# Patient Record
Sex: FEMALE | Race: WHITE | URBAN - METROPOLITAN AREA
[De-identification: names, ages, dates, MRNs, and addresses within clinical notes are randomized per-mention and may not be internally consistent; named-entity substitution may affect disease eponyms.]

---
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
## 2018-03-09 ENCOUNTER — CARE COORDINATION (OUTPATIENT)
Dept: GASTROENTEROLOGY | Facility: CLINIC | Age: 18
End: 2018-03-09

## 2018-03-09 DIAGNOSIS — K59.01 SLOW TRANSIT CONSTIPATION: Primary | ICD-10-CM

## 2018-03-09 DIAGNOSIS — K21.9 GASTROESOPHAGEAL REFLUX DISEASE WITHOUT ESOPHAGITIS: ICD-10-CM

## 2018-03-09 RX ORDER — OMEPRAZOLE 40 MG/1
40 CAPSULE, DELAYED RELEASE ORAL DAILY
Qty: 90 CAPSULE | Refills: 3 | Status: SHIPPED | OUTPATIENT
Start: 2018-03-09 | End: 2018-03-12

## 2018-03-09 RX ORDER — POLYETHYLENE GLYCOL 3350 17 G/17G
1 POWDER, FOR SOLUTION ORAL DAILY
Qty: 500 G | Refills: 1 | Status: SHIPPED | OUTPATIENT
Start: 2018-03-09

## 2018-03-09 NOTE — PROGRESS NOTES
"email rec'd from Aiyana Bearden (mom):    Claudia has been in pain while eating or drinking for the past several weeks and possibly longer as this strange event began to take hold while she was at college at [employer] in [place].  She is now only eating and drinking in a very minimal manner.  After going to the ER two days ago, due to exhaustion and pain, and being told she really just need to go to a specialist, she received permission from the school to depart before the end of the trimester and I picked her up yesterday.  I will take her back to a er if this continues to worsen for her, or maybe it will get better, if we are unable to see the doctor before Monday.   I have not seen her since Christmas break and she is clearly different.  She say she has said she has never been so exhausted, and she clearly is in pain even when not drinking or eating.  She has currently discontinued her regular use of mirilax.  Please let me know if there is anything available this afternoon or call Claudia.  Claudia  [phone] or me Aiyana .    Per Claudia, for 2 months drinking water causes terrible clenching pain under he ribs, inconsistently on the left or right. For 1 week the pain comes with eating/drinking, but worst with water. Way worse than it ever was  No stool out x 1 week. \"Softish\" daily daily stools before that, no blood, no fever.  Taking Miralax daily except the past couple of days. Saw a local MD who suggested Zantac; Claudia doesn't think it's made any difference.      Per Dr. Martin, suggest 40 mg of omeprazole (or insurance allowed) and bowel clean out.    "

## 2018-03-12 ENCOUNTER — OFFICE VISIT (OUTPATIENT)
Dept: GASTROENTEROLOGY | Facility: CLINIC | Age: 18
End: 2018-03-12
Attending: PEDIATRICS
Payer: COMMERCIAL

## 2018-03-12 ENCOUNTER — TELEPHONE (OUTPATIENT)
Dept: GASTROENTEROLOGY | Facility: CLINIC | Age: 18
End: 2018-03-12

## 2018-03-12 VITALS
BODY MASS INDEX: 25.64 KG/M2 | SYSTOLIC BLOOD PRESSURE: 122 MMHG | WEIGHT: 153.88 LBS | HEIGHT: 65 IN | DIASTOLIC BLOOD PRESSURE: 90 MMHG | HEART RATE: 79 BPM

## 2018-03-12 DIAGNOSIS — K21.9 GASTROESOPHAGEAL REFLUX DISEASE WITHOUT ESOPHAGITIS: ICD-10-CM

## 2018-03-12 DIAGNOSIS — E73.9 LACTOSE INTOLERANCE: ICD-10-CM

## 2018-03-12 DIAGNOSIS — R11.0 NAUSEA: ICD-10-CM

## 2018-03-12 DIAGNOSIS — K59.01 SLOW TRANSIT CONSTIPATION: Primary | ICD-10-CM

## 2018-03-12 PROCEDURE — G0463 HOSPITAL OUTPT CLINIC VISIT: HCPCS | Mod: ZF

## 2018-03-12 RX ORDER — OMEPRAZOLE 40 MG/1
40 CAPSULE, DELAYED RELEASE ORAL DAILY
Qty: 90 CAPSULE | Refills: 3 | Status: SHIPPED | OUTPATIENT
Start: 2018-03-12 | End: 2018-09-25

## 2018-03-12 ASSESSMENT — PAIN SCALES - GENERAL: PAINLEVEL: NO PAIN (0)

## 2018-03-12 NOTE — TELEPHONE ENCOUNTER
Prior Authorization Retail Medication Request    Medication/Dose: omeprazole or esopmeprazole  ICD code GERD K21.9  Previously Tried and Failed: ranitadine; sx persisted  Rationale:    Insurance Name: [employer]  Insurance ID: [id number]      Pharmacy Information (if different than what is on RX)  Name: Samantha  [address]  Phone: [phone]

## 2018-03-12 NOTE — PATIENT INSTRUCTIONS
If you have any questions during regular office hours, please contact the nurse line at [phone] (Diana or Elisabeth).     If acute concerns arise after hours, you can call [phone] and ask to speak to the pediatric gastroenterologist on call.     If you have scheduling needs, please call the Call Center at [phone].     Outside lab and imaging results should be faxed to [phone].  If you go to a lab outside of [place] we will not automatically get those results. You will need to ask them to send them to us.        Start omeprazole 40mg daily    Slowly wean off of Caffeine    Start Enteric Coated Peppermint oil 1 cap as needed for nausea.  Marge's Tummy tamers and IBGuard are 2 brands, you can find both online or IBGuard at Target, [facility], or [facility]    Continue with Miralax 1 cap a day, if not having stools daily increase to 2 times a day    Continue increased water with a goal of 80oz a day this will help with the weakness and lightheadedness and possibly nausea

## 2018-03-12 NOTE — NURSING NOTE
"Chief Complaint   Patient presents with     RECHECK     Abdominal pain, trouble eating       Initial /90 (BP Location: Right arm, Patient Position: Sitting, Cuff Size: Adult Regular)  Pulse 79  Ht 5' 5.32\" (165.9 cm)  Wt 153 lb 14.1 oz (69.8 kg)  BMI 25.36 kg/m2 Estimated body mass index is 25.36 kg/(m^2) as calculated from the following:    Height as of this encounter: 5' 5.32\" (165.9 cm).    Weight as of this encounter: 153 lb 14.1 oz (69.8 kg).  Medication Reconciliation: joel Blake LPN  Patient/Family was offered and declined mychart      "

## 2018-03-12 NOTE — MR AVS SNAPSHOT
After Visit Summary   3/12/2018    Claudia Galdamez    MRN: [id number]           Patient Information     Date Of Birth          2000        Visit Information        Provider Department      3/12/2018 10:30 AM Yani Bella MD Peds GI        Today's Diagnoses     Slow transit constipation    -  1    Lactose intolerance        Gastroesophageal reflux disease without esophagitis          Care Instructions     If you have any questions during regular office hours, please contact the nurse line at [phone] (Diana or Elisabeth).     If acute concerns arise after hours, you can call [phone] and ask to speak to the pediatric gastroenterologist on call.     If you have scheduling needs, please call the Call Center at [phone].     Outside lab and imaging results should be faxed to [phone].  If you go to a lab outside of [place] we will not automatically get those results. You will need to ask them to send them to us.        Start omeprazole 40mg daily    Slowly wean off of Caffeine    Start Enteric Coated Peppermint oil 1 cap as needed for nausea.  Marge's Tummy tamers and IBGuard are 2 brands, you can find both online or IBGuard at Target, CVS, or [facility]    Continue with Miralax 1 cap a day, if not having stools daily increase to 2 times a day    Continue increased water with a goal of 80oz a day this will help with the weakness and lightheadedness and possibly nausea          Follow-ups after your visit        Follow-up notes from your care team     Return in about 3 months (around 6/12/2018).      Who to contact     Please call your clinic at [phone] to:    Ask questions about your health    Make or cancel appointments    Discuss your medicines    Learn about your test results    Speak to your doctor            Additional Information About Your Visit        MyChart Information     La[employer]m is an electronic gateway that provides easy, online access to your  "medical records. With A[employer]on, you can request a clinic appointment, read your test results, renew a prescription or communicate with your care team.     To sign up for Anexon visit the website at [email or web address]   You will be asked to enter the access code listed below, as well as some personal information. Please follow the directions to create your username and password.     Your access code is: [email or web address]  Expires: 6/10/2018 11:04 AM     Your access code will  in 90 days. If you need help or a new code, please contact your [facility] or call [phone] for assistance.        Care EveryWhere ID     This is your Care EveryWhere ID. This could be used by other organizations to access your [facility] medical records  [id number]        Your Vitals Were     Pulse Height BMI (Body Mass Index)             79 5' 5.32\" (165.9 cm) 25.36 kg/m2          Blood Pressure from Last 3 Encounters:   18 122/90   17 126/83   16 109/78    Weight from Last 3 Encounters:   18 153 lb 14.1 oz (69.8 kg) (84 %)*   17 144 lb 13.5 oz (65.7 kg) (80 %)*   16 147 lb 14.9 oz (67.1 kg) (83 %)*     * Growth percentiles are based on [facility] 2-20 Years data.              Today, you had the following     No orders found for display         Where to get your medicines      These medications were sent to [id number] - [place], [place] - [address] [place] [address] [place] [address]     Phone:  [phone]     omeprazole 40 MG capsule          Primary Care Provider Fax #    [facility] [phone]       [address] [place] [address]        Equal Access to Services     NEGRA GAN : Hugo Robertson, jose d hidalgo, bria valle. [facility] [phone].    ATENCIÓN: Si delorisla maxine, tiene a fernandes disposición servicios " jose maria de asistencia lingüística. Leo sood [phone].    We comply with applicable federal civil rights laws and [place] laws. We do not discriminate on the basis of race, color, national origin, age, disability, sex, sexual orientation, or gender identity.            Thank you!     Thank you for choosing PEDS GI  for your care. Our goal is always to provide you with excellent care. Hearing back from our patients is one way we can continue to improve our services. Please take a few minutes to complete the written survey that you may receive in the mail after your visit with us. Thank you!             Your Updated Medication List - Protect others around you: Learn how to safely use, store and throw away your medicines at www.disposemymeds.org.          This list is accurate as of 3/12/18 11:04 AM.  Always use your most recent med list.                   Brand Name Dispense Instructions for use Diagnosis    ALLEGRA PO      Take 180 mg by mouth daily as needed        * MIRALAX powder   Generic drug:  polyethylene glycol      Take 1 capful by mouth daily        * polyethylene glycol powder    MIRALAX    500 g    Take 17 g (1 capful) by mouth daily    Slow transit constipation, Gastroesophageal reflux disease without esophagitis       omeprazole 40 MG capsule    priLOSEC    90 capsule    Take 1 capsule (40 mg) by mouth daily Take 30-60 minutes before a meal.    Gastroesophageal reflux disease without esophagitis       SINGULAIR PO      Take by mouth At Bedtime        * Notice:  This list has 2 medication(s) that are the same as other medications prescribed for you. Read the directions carefully, and ask your doctor or other care provider to review them with you.

## 2018-03-12 NOTE — LETTER
3/12/2018      RE: Claudia Galdamez  [address] [facility] [address]            Pediatric Gastroenterology,   Hepatology, and Nutrition               Outpatient follow up consultation    [facility]ult[facility]     Diagnoses:  Patient Active Problem List   Diagnosis     Slow transit constipation     Lactose intolerance         HPI: Claudia is a 17 year old female with multiple allergies, constipation, lactose intolerance, and abdominal pain.      Claudia had been doing well until fall when she started to have more trouble with nausea.  She first attributed to the new water at school as her symptoms happen only with drinking water.  She reports that when she drank water she would get nauseous without any vomiting, and then at times will develop pain in the epigastric region that radiated to both her flanks.  She did feel very weak after the nausea and run down.  The symptoms did not happen with food.    About a week ago her symptoms acutely worsened and she started to have the pain and nausea with food and liquids.  She was seen at a local emergency department and reports that lab work at that time was normal (labs not available to me today in clinic).  2 days later she has seen at her gynecologist's office and had a pelvic ultrasound which was normal.    She had not stooled for a week and so was recommended that she do a cleanout when she did this weekend.  She reports that she had 2 large stools and then was just having liquid out.  She states that over the last several days her pain has improved she is able to eat but she continues to have trouble with the nausea.  She did not start the omeprazole.    She denies regurgitation or sour taste in her mouth.  She received 4 days of Zantac which did not help her symptoms.    She does not feel that she is really stressed her that stress is contributing to her symptoms.  She does note that she has had increased burping over the  last week.    Stool: Every other day, mashed potato in consistency.  No pain or blood.  Miralax 1 cap daily.    Water: 4 large glasses a day    Claudia has had weight gain since I last saw her in clinic about 1 year ago, although she has had weight loss of about 11 lbs over the last 6 months based on outside weights.    She had a pelvic US on Friday which was normal, she also had gonorrhea and chlamydia screening which was negative.      Review of Systems: A complete 10 point review of systems was negative except as note in this note and below.        Allergies: Lentil; Peas; Cats; Cumin oil; Nutmeg oil (myristica oil); Nuts; and Seasonal allergies  Prescription Medications as of 3/12/2018             polyethylene glycol (MIRALAX) powder Take 17 g (1 capful) by mouth daily    Fexofenadine HCl (ALLEGRA PO) Take 180 mg by mouth daily as needed     Montelukast Sodium (SINGULAIR PO) Take by mouth At Bedtime     polyethylene glycol (MIRALAX) powder Take 1 capful by mouth daily    omeprazole (PRILOSEC) 40 MG capsule Take 1 capsule (40 mg) by mouth daily Take 30-60 minutes before a meal.          Past Medical History: I have reviewed this patient's past medical history and updated as appropriate.   Past Medical History:   Diagnosis Date     Constipation      Eructation      Slow transit constipation         Past Surgical History: I have reviewed this patient's past medical history and updated as appropriate.   Past Surgical History:   Procedure Laterality Date     COLONOSCOPY N/A 12/2/2016    Procedure: COMBINED COLONOSCOPY, SINGLE OR MULTIPLE BIOPSY/POLYPECTOMY BY BIOPSY;  Surgeon: Yani Bella MD;  Location: [facility]      ESOPHAGOSCOPY, GASTROSCOPY, DUODENOSCOPY (EGD), COMBINED N/A 12/2/2016    Procedure: COMBINED ESOPHAGOSCOPY, GASTROSCOPY, DUODENOSCOPY (EGD), BIOPSY SINGLE OR MULTIPLE;  Surgeon: Yani Bella MD;  Location: [facility]        Family History:  "  Maternal grandmother with reflux, Cash's esophagus and esophageal cancer. 2 older sisters with GERD, 1 of whom has vocal cord dysfunction 2/2 GERD. Negative for: Cystic fibrosis, Celiac disease, Crohn's disease, Ulcerative Colitis, Polyposis syndromes, Hepatitis, Other liver disorders, Pancreatitis, GI cancers in young family members, Thyroid disease, Insulin dependent diabetes.     Social History: Is currently in college at [employer] in [place] and is enjoying school.  She got an extension for her finals due to the fact she ahs not been feeling well.    She is a senior and is going to [employer] in the fall    Physical exam:  Vital Signs: Ht 5' 5.32\" (165.9 cm)  Wt 153 lb 14.1 oz (69.8 kg)  BMI 25.36 kg/m2. (66 %ile based on CDC 2-20 Years stature-for-age data using vitals from 3/12/2018. 84 %ile based on CDC 2-20 Years weight-for-age data using vitals from 3/12/2018. Body mass index is 25.36 kg/(m^2). 81 %ile based on CDC 2-20 Years BMI-for-age data using vitals from 3/12/2018.)  Constitutional: Healthy, alert and no distress  Head: Normocephalic. No masses, lesions, tenderness or abnormalities  Neck: Neck supple.  EYE: MAICO, EOMI, anicteric  ENT: Ears: Normal position, Nose: No discharge and Mouth: Normal, moist mucous membranes  Cardiovascular: Heart: Regular rate and rhythm  Respiratory: Lungs clear to auscultation bilaterally.  Gastrointestinal: Abdomen:, Soft, Nontender, Nondistended, Normal bowel sounds, No hepatomegaly, No splenomegaly, Rectal: Deferred  Musculoskeletal: Extremities warm, well perfused.   Skin: No suspicious lesions or rashes      I personally reviewed results of laboratory evaluation, imaging studies and past medical records that were available during this outpatient visit.      Assessment and Plan:  18 yo female with nausea, abdominal pain, lactose intolerance, and constipation.  Most likely she has functional dyspepsia/nasuea and symptoms have been worsened " by her chronic constipation.  GERD must also be considered as a possible cause of symptoms.  Given reported normal lab workup and symptoms history, gallbladder, liver, and pancreatic disease is unlikely.      -Continue Miralax 1 cap daily, if not stooling daily recommend increasing to 1 cap 2 times a day  -Fluid goal of 80oz a day to help soften stools   -Start omeprazole 40 mg daily  -Start enteric coated peppermint oil PRN nausea, if symptoms not improving can consider a trial of cyproheptadine given recent weight loss.  Would also consider repeating bloodwork (CMP, CBC, lipase, and amylase)  -Continue to avoid lactose  -Encouraged to call if symptoms not improving  -I discussed with Claudia that I do not feel like the water is the cause of her symptoms, but may have been a trigger for symptoms starting.  I also explained the pathophysiology of functional disorders and natural course       No orders of the defined types were placed in this encounter.        I discussed the plan of care with Claudia and her mom including symptoms , differential diagnosis, diagnostic work up, treament , potential side effects and complications and follow up plan.       Follow up: Data Unavailable or earlier should patient become symptomatic.      Yani Bella MD, [facility]  Pediatric Gastroenterology  [facility]    CC  Patient Care Team:  [facility], [facility] as PCP - General

## 2018-03-13 NOTE — TELEPHONE ENCOUNTER
Central Prior Authorization Team   Phone: [phone]      Submitted P/A demographics to insurance, awaiting questions.

## 2018-03-13 NOTE — TELEPHONE ENCOUNTER
Tried P/A for Omeprazole and here is the response:              Then tried P/A for Esomeprazole and here is the response:

## 2018-03-13 NOTE — TELEPHONE ENCOUNTER
Received letter that Omeprazole is non-formulary and P/A request is not needed. So I have resubmitted P/A request for Esomeprazole.

## 2018-09-11 ENCOUNTER — CARE COORDINATION (OUTPATIENT)
Dept: GASTROENTEROLOGY | Facility: CLINIC | Age: 18
End: 2018-09-11

## 2018-09-11 NOTE — PROGRESS NOTES
"09/11  Mom requesting urgent appointment for weight loss and abdominal pain.    Last seen in March 2018, at which time plan was to push fluids, take Miralax, start omeprazole, try peppermint oil, and avoid avoid lactose.    No further contact until today. Continued to have trouble eating throughout the summer and was extreemly tired, so PCP thought about chronic fatigue. Lost about 20 lbs.     Went back to school in [place] 8/26/18. Had \"extreem pain, nausea and shakiness with eating any food at all\". Never had any vomiting  Ended up in the ED a couple of times. Was on a low dose of prozac for one week, which caused a \"terrible diarrhea\" that \"purged\" her. Then, she did not stool for 1 week and had a very soft but not watery stool with no shape today. Current weight is 135 at home, down about 18# since 3/9/18. Claudia states that she wants to eat and doesn't feel good about \"losing a ridiculous amount of weight\".    In an effort to keep her in school, mom came to prepare foods for her and she was able to eat a little better. Feels she does better with blander foods. They decided it was not working to stay in school and came home 3 days ago.    Changed omeprazole to lansoprazole per an MD in [place]. Did try peppermint oil, and currently tries her \"very best to stay off lactose whenever I can\".    Will discuss with Dr. Bella. Meanwhile, Claudia will keep a symptom diary for 2-3 days trying to link symptoms to any triggers.    Dr. Martin suggested that we look into transitioning to an adult provider (and we booked 11/2/18) Provided Claudia the number to call Tania Breen, as Dr. Orr has openings at the end of September.    "

## 2018-09-18 ENCOUNTER — TELEPHONE (OUTPATIENT)
Dept: GASTROENTEROLOGY | Facility: CLINIC | Age: 18
End: 2018-09-18

## 2018-09-18 NOTE — TELEPHONE ENCOUNTER
PREVISIT INFORMATION                                                    Cluadia Galdamez scheduled for future visit at [facility] specialty clinics.    Patient is scheduled to see Dr Orr  on 9/25/18  Reason for visit: GI issues  Referring provider   Has patient seen previous specialist? Yes GI Peds at [facility]  Medical Records:  Available in chart.  Patient was previously seen at a [place] or [facility] facility.    REVIEW                                                      New patient packet mailed to patient: No  Medication reconciliation complete: No      Current Outpatient Prescriptions   Medication Sig Dispense Refill     Fexofenadine HCl (ALLEGRA PO) Take 180 mg by mouth daily as needed        Montelukast Sodium (SINGULAIR PO) Take by mouth At Bedtime        omeprazole (PRILOSEC) 40 MG capsule Take 1 capsule (40 mg) by mouth daily Take 30-60 minutes before a meal. 90 capsule 3     polyethylene glycol (MIRALAX) powder Take 17 g (1 capful) by mouth daily 500 g 1     polyethylene glycol (MIRALAX) powder Take 1 capful by mouth daily         Allergies: Lentil; Peas; Cats; Cumin oil; Nutmeg oil (myristica oil); Nuts; and Seasonal allergies        PLAN/FOLLOW-UP NEEDED                                                      Previsit review complete.  Patient will see provider at future scheduled appointment.     Patient Reminders Given:  Please, make sure you bring an updated list of your medications.   If you are having a procedure, please, present 15 minutes early.  If you need to cancel or reschedule,please call [phone].    Ralph DANGELO

## 2018-09-25 ENCOUNTER — OFFICE VISIT (OUTPATIENT)
Dept: GASTROENTEROLOGY | Facility: CLINIC | Age: 18
End: 2018-09-25
Payer: COMMERCIAL

## 2018-09-25 VITALS
WEIGHT: 145.6 LBS | SYSTOLIC BLOOD PRESSURE: 112 MMHG | HEIGHT: 66 IN | HEART RATE: 66 BPM | OXYGEN SATURATION: 100 % | BODY MASS INDEX: 23.4 KG/M2 | DIASTOLIC BLOOD PRESSURE: 74 MMHG

## 2018-09-25 DIAGNOSIS — R11.0 NAUSEA: ICD-10-CM

## 2018-09-25 DIAGNOSIS — R10.13 ABDOMINAL PAIN, EPIGASTRIC: Primary | ICD-10-CM

## 2018-09-25 LAB
ALBUMIN SERPL-MCNC: 4 G/DL (ref 3.4–5)
ALP SERPL-CCNC: 61 U/L (ref 40–150)
ALT SERPL W P-5'-P-CCNC: 34 U/L (ref 0–50)
AMYLASE SERPL-CCNC: 59 U/L (ref 30–110)
AST SERPL W P-5'-P-CCNC: 26 U/L (ref 0–35)
BASOPHILS # BLD AUTO: 0 10E9/L (ref 0–0.2)
BASOPHILS NFR BLD AUTO: 0.5 %
BILIRUB DIRECT SERPL-MCNC: 0.1 MG/DL (ref 0–0.2)
BILIRUB SERPL-MCNC: 0.5 MG/DL (ref 0.2–1.3)
DIFFERENTIAL METHOD BLD: NORMAL
EOSINOPHIL # BLD AUTO: 0.1 10E9/L (ref 0–0.7)
EOSINOPHIL NFR BLD AUTO: 1.5 %
ERYTHROCYTE [DISTWIDTH] IN BLOOD BY AUTOMATED COUNT: 12.3 % (ref 10–15)
HCT VFR BLD AUTO: 40.3 % (ref 35–47)
HGB BLD-MCNC: 13.6 G/DL (ref 11.7–15.7)
IMM GRANULOCYTES # BLD: 0 10E9/L (ref 0–0.4)
IMM GRANULOCYTES NFR BLD: 0.3 %
LIPASE SERPL-CCNC: 114 U/L (ref 73–393)
LYMPHOCYTES # BLD AUTO: 2.9 10E9/L (ref 0.8–5.3)
LYMPHOCYTES NFR BLD AUTO: 46.6 %
MCH RBC QN AUTO: 31 PG (ref 26.5–33)
MCHC RBC AUTO-ENTMCNC: 33.7 G/DL (ref 31.5–36.5)
MCV RBC AUTO: 92 FL (ref 78–100)
MONOCYTES # BLD AUTO: 0.5 10E9/L (ref 0–1.3)
MONOCYTES NFR BLD AUTO: 7.3 %
NEUTROPHILS # BLD AUTO: 2.7 10E9/L (ref 1.6–8.3)
NEUTROPHILS NFR BLD AUTO: 43.8 %
PLATELET # BLD AUTO: 236 10E9/L (ref 150–450)
PROT SERPL-MCNC: 7.3 G/DL (ref 6.8–8.8)
RBC # BLD AUTO: 4.39 10E12/L (ref 3.8–5.2)
WBC # BLD AUTO: 6.2 10E9/L (ref 4–11)

## 2018-09-25 PROCEDURE — 36415 COLL VENOUS BLD VENIPUNCTURE: CPT | Performed by: INTERNAL MEDICINE

## 2018-09-25 PROCEDURE — 85025 COMPLETE CBC W/AUTO DIFF WBC: CPT | Performed by: INTERNAL MEDICINE

## 2018-09-25 PROCEDURE — 83690 ASSAY OF LIPASE: CPT | Performed by: INTERNAL MEDICINE

## 2018-09-25 PROCEDURE — 80076 HEPATIC FUNCTION PANEL: CPT | Performed by: INTERNAL MEDICINE

## 2018-09-25 PROCEDURE — 82150 ASSAY OF AMYLASE: CPT | Performed by: INTERNAL MEDICINE

## 2018-09-25 PROCEDURE — 99204 OFFICE O/P NEW MOD 45 MIN: CPT | Performed by: INTERNAL MEDICINE

## 2018-09-25 ASSESSMENT — PAIN SCALES - GENERAL: PAINLEVEL: NO PAIN (0)

## 2018-09-25 NOTE — PATIENT INSTRUCTIONS
Obtain lab work    Submit stool sample    Obtain ultrasound abdomen    Obtain gastric emptying study    Continue acid reducing medication and miralax.

## 2018-09-25 NOTE — MR AVS SNAPSHOT
After Visit Summary   9/25/2018    Claudia Galdamez    MRN: [id number]           Patient Information     Date Of Birth          2000        Visit Information        Provider Department      9/25/2018 12:30 PM Ralph Orr MD [facility]        Today's Diagnoses     Abdominal pain, epigastric    -  1    Nausea          Care Instructions    Obtain lab work    Submit stool sample    Obtain ultrasound abdomen    Obtain gastric emptying study    Continue acid reducing medication and miralax.              Follow-ups after your visit        Follow-up notes from your care team     Return in about 8 weeks (around 11/20/2018).      Your next 10 appointments already scheduled     Sep 26, 2018  9:30 AM CDT   US ABDOMEN COMPLETE with UUUS2   [facility], [place], Ultrasound ([facility], [facility])    [address] [address] [phone]           How do I prepare for my exam? (Food and drink instructions) Adults: No eating, smoking, gum chewing or drinking for 8 hours before the exam. You may take medicine with a small sip of water.  Children: * Infants, breast-fed: may have breast milk up to 2 hours before exam. * Infants, formula: may have bottle until 4 hours before exam. * Children 1-5 years: No food or drink for 4 hours before exam. * Children 6 -12 years: No food or drink for 6 hours before exam. * Children over 12 years: No food or drink for 8 hours before exam.  * J Tube Fed: No need to stop feedings.  What should I wear: Wear comfortable clothes.  How long does the exam take: Most ultrasounds take 30 to 60 minutes.  What should I bring: Bring a list of your medicines, including vitamins, minerals and over-the-counter drugs. It is safest to leave personal items at home.  Do I need a :  No  is needed.  What do I need to tell my doctor: Tell your doctor about any allergies you may have.  What should I do  after the exam: No restrictions, You may resume normal activities.  What is this test: An ultrasound uses sound waves to make pictures of the body. Sound waves do not cause pain. The only discomfort may be the pressure of the wand against your skin or full bladder.  Who should I call with questions: If you have any questions, please call the Imaging Department where you will have your exam. Directions, parking instructions, and other information is available on our website, [email or web address].            Sep 28, 2018  8:30 AM CDT   NM GASTRIC EMPTYING UU UR MG with UUNM4   [facility], [place], Nuclear Medicine ([facility], [facility])    [address] [address] [phone]           How do I prepare for my exam? (Food and drink instructions) No food or drink (including water) for 4 hours prior to the exam.  What should I wear: You should wear comfortable clothes.  How long does the exam take: *Gastric emptying scan: Please allow 2 to 5 hours for this exam. *Meckel s scan: This exam takes 60 to 90 minutes.  How long does the exam take?  Most scans will take between 2-3 hours.  What should I bring: Please bring a list of your medicines (including vitamins, minerals and over-the-counter drugs). Please bring related prior results and films. Leave your valuables at home.  Do I need a :  No  is needed.  What do I need to tell my doctor? Tell your doctor: * If you are breastfeeding or may be pregnant. * If you have had a barium test within the past few days. Barium may change the results of certain exams.  What should I do after the exam: No restrictions, You may resume normal activities. The radioactive fluid will leave your body when you urinate (use the toilet). If you drink extra fluids, it will leave your body faster.  What is this test? A nuclear medicine scan uses tiny amounts of radioactive fluid to help us see inside your body. It is usually  given as an injection (shot) in your arm or hand. Sometimes it is breathed in or swallowed. The fluid helps us see your insides better on the pictures we will take. We use a special scanner to take these pictures.  *Gastric emptying: This scan checks the time it takes for your stomach to empty. You will eat oatmeal or scrambled eggs that contain radioactive material. We will then take a set of pictures for the next 11/2 to 4 hours.  *Meckel s scan: This scan checks for problems that might cause bleeding or pain in the intestines. We begin taking pictures right after we inject the radioactive fluid.  Who should I call with questions: Please call your Imaging Department at your exam site with any questions. Directions, parking instructions, and other information is available on our website, [email or web address].            Nov 02, 2018  2:30 PM CDT   Return Visit with Yani Bella MD   Peds GI ([facility])    [address], [address] [place], [place] [address] [address] [address]   [phone]            Dec 04, 2018 11:00 AM CST   Return Visit with Ralph Orr MD   [facility]    [address] [place] [address] [phone]              Future tests that were ordered for you today     Open Future Orders        Priority Expected Expires Ordered    NM Gastric Emptying Routine  9/25/2019 9/25/2018    CBC with platelets differential Routine  9/25/2019 9/25/2018    Amylase Routine  9/25/2019 9/25/2018    Lipase Routine  9/25/2019 9/25/2018    H Pylori antigen stool Routine  9/25/2019 9/25/2018    US Abdomen Complete Routine  9/25/2019 9/25/2018    Hepatic panel Routine  9/25/2019 9/25/2018            Who to contact     If you have questions or need follow up information about today's clinic visit or your schedule please contact [facility] directly at [phone].  Normal or non-critical lab  "and imaging results will be communicated to you by MyChart, letter or phone within 4 business days after the clinic has received the results. If you do not hear from us within 7 days, please contact the clinic through [facility] or phone. If you have a critical or abnormal lab result, we will notify you by phone as soon as possible.  Submit refill requests through [employer] or call your pharmacy and they will forward the refill request to us. Please allow 3 business days for your refill to be completed.          Additional Information About Your Visit        [employer] Information     [employer] is an electronic gateway that provides easy, online access to your medical records. With [employer], you can request a clinic appointment, read your test results, renew a prescription or communicate with your care team.     To sign up for [employer] visit the website at [email or web address]   You will be asked to enter the access code listed below, as well as some personal information. Please follow the directions to create your username and password.     Your access code is: [email or web address]  Expires: 2018  1:30 PM     Your access code will  in 90 days. If you need help or a new code, please contact your [facility] or call [phone] for assistance.        Care EveryWhere ID     This is your Care EveryWhere ID. This could be used by other organizations to access your [facility] medical records  [id number]        Your Vitals Were     Pulse Height Pulse Oximetry BMI (Body Mass Index)          66 1.664 m (5' 5.5\") 100% 23.86 kg/m2         Blood Pressure from Last 3 Encounters:   18 112/74   18 122/90   17 126/83    Weight from Last 3 Encounters:   18 66 kg (145 lb 9.6 oz) (76 %)*   18 69.8 kg (153 lb 14.1 oz) (84 %)*   17 65.7 kg (144 lb 13.5 oz) (80 %)*     * Growth percentiles are based on CDC 2-20 Years data.               Primary Care Provider Fax #    " [facility] [phone]       [address] [place] [address]        Equal Access to Services     NEGRA GAN : Hadii aad ku hadsridevicarl Robertson, wamarisolda marissasampsonha, karta kaarelyda kaliagiuseppeshanta, bria danamberlinda olivares. So [facility] [phone].    ATENCIÓN: Si habla español, tiene a fernandes disposición servicios gratuitos de asistencia lingüística. Llame al [phone].    We comply with applicable federal civil rights laws and [place] laws. We do not discriminate on the basis of race, color, national origin, age, disability, sex, sexual orientation, or gender identity.            Thank you!     Thank you for choosing [facility]  for your care. Our goal is always to provide you with excellent care. Hearing back from our patients is one way we can continue to improve our services. Please take a few minutes to complete the written survey that you may receive in the mail after your visit with us. Thank you!             Your Updated Medication List - Protect others around you: Learn how to safely use, store and throw away your medicines at www.disposemymeds.org.          This list is accurate as of 9/25/18  1:32 PM.  Always use your most recent med list.                   Brand Name Dispense Instructions for use Diagnosis    ALLEGRA PO      Take 180 mg by mouth daily as needed        ESOMEPRAZOLE MAGNESIUM PO           polyethylene glycol powder    MIRALAX    500 g    Take 17 g (1 capful) by mouth daily    Slow transit constipation, Gastroesophageal reflux disease without esophagitis       SINGULAIR PO      Take by mouth At Bedtime

## 2018-09-26 ENCOUNTER — HOSPITAL ENCOUNTER (OUTPATIENT)
Dept: ULTRASOUND IMAGING | Facility: CLINIC | Age: 18
Discharge: HOME | End: 2018-09-26
Attending: INTERNAL MEDICINE | Admitting: INTERNAL MEDICINE
Payer: COMMERCIAL

## 2018-09-26 DIAGNOSIS — R10.13 ABDOMINAL PAIN, EPIGASTRIC: ICD-10-CM

## 2018-09-26 PROCEDURE — 76700 US EXAM ABDOM COMPLETE: CPT

## 2018-09-26 NOTE — PROGRESS NOTES
GASTROENTEROLOGY NEW PATIENT CLINIC VISIT    CC/REFERRING MD:    [facility]    REASON FOR CONSULTATION:   [facility] for   Chief Complaint   Patient presents with     Consult     stomach aches/nausea        HISTORY OF PRESENT ILLNESS:    Claudia Galdamez is 17 year old female who presents for evaluation of nausea, belching, and abdominal pain.  She is a known patient of pediatric gastroenterology and is seen Yani Bella for several years including concerning she most recently followed with pediatric GI group in March 2018 when she was noted to have increased trouble with nausea, abdominal pain and constipation.  She was prescribed a proton pump inhibitor at that time recommended to continue a bowel regimen.  More recently, the patient was at college he has had increased nausea and weight loss reported to be approximately 20 pounds.  Her mother came and took her home from school in order to seek further medical care.  She was recommended to transition from pediatric GI to adult GI at this point.    The patient comes in today for further evaluation of the symptoms.  She is accompanied by her mother today.  They note that she has had daily nausea and severe symptoms of belching recently.  They note that the belching is frequent, loud, and unusual.  There is not vomiting associated with these episodes.  There is not dysphagia.  She notes that she has been having more regular bowel movements with MiraLAX regimen.  If she does not take her MiraLAX, she can go up to 7 days without a bowel movement.  However she typically has bowel movements daily or every other day when she is on her regimen.  She is also taking omeprazole 40 mg daily at this time.    She notes that she is currently at home and not working.  She notes that when she was at school, her studies are going well and she was involved in the collegiate community.  The patient notes that her health is otherwise good.   She does note frequent strep infections and takes antibiotics several times per year.  She is not able to specifically identify if her GI symptoms are worse after taking antibiotics.    PREVIOUS ENDOSCOPY:  Procedure Date: 12/2/2016 9:12 AM  Findings:        The examined esophagus was normal. Biopsies were taken with a cold        forceps for histology.        The entire examined stomach was normal. Biopsies were taken with a cold        forceps for histology.        The examined duodenum was normal. Biopsies were taken with a cold        forceps for histology.                                                                                     Impression:           - Normal esophagus. Biopsied.                         - Normal stomach. Biopsied.                         - Normal examined duodenum. Biopsied.   Recommendation:       - Await pathology results.    Procedure Date: 12/2/2016 9:29 AM                                                                                     Findings:        The colon (entire examined portion) appeared normal.        The terminal ileum appeared normal. Biopsies were taken with a cold        forceps for histology.                                                                                     Impression:           - The entire examined colon is normal.                         - The examined portion of the ileum was normal.                         Biopsied.   Recommendation:       - Await pathology results.   PROBLEM LIST  Patient Active Problem List    Diagnosis Date Noted     Slow transit constipation 11/16/2015     Priority: Medium     Lactose intolerance 11/16/2015     Priority: Medium       PERTINENT PAST MEDICAL HISTORY:  (I personally reviewed this history with the patient at today's visit)   Past Medical History:   Diagnosis Date     Constipation      Eructation      Slow transit constipation          PREVIOUS SURGERIES: (I personally reviewed this history with the patient  at today's visit)   Past Surgical History:   Procedure Laterality Date     COLONOSCOPY N/A 12/2/2016    Procedure: COMBINED COLONOSCOPY, SINGLE OR MULTIPLE BIOPSY/POLYPECTOMY BY BIOPSY;  Surgeon: Yani Bella MD;  Location: [facility]      ESOPHAGOSCOPY, GASTROSCOPY, DUODENOSCOPY (EGD), COMBINED N/A 12/2/2016    Procedure: COMBINED ESOPHAGOSCOPY, GASTROSCOPY, DUODENOSCOPY (EGD), BIOPSY SINGLE OR MULTIPLE;  Surgeon: Yani Bella MD;  Location: [facility]      ALLERGIES:     Allergies   Allergen Reactions     Lentil Anaphylaxis     Peas Anaphylaxis     Cats      Cumin Oil      Nutmeg Oil (Myristica Oil)      Nuts      Seasonal Allergies        PERTINENT MEDICATIONS:    Current Outpatient Prescriptions:      ESOMEPRAZOLE MAGNESIUM PO, , Disp: , Rfl:      Fexofenadine HCl (ALLEGRA PO), Take 180 mg by mouth daily as needed , Disp: , Rfl:      Montelukast Sodium (SINGULAIR PO), Take by mouth At Bedtime , Disp: , Rfl:      polyethylene glycol (MIRALAX) powder, Take 17 g (1 capful) by mouth daily, Disp: 500 g, Rfl: 1    SOCIAL HISTORY:  Social History     Social History     Marital status: Single     Spouse name: N/A     Number of children: N/A     Years of education: N/A     Occupational History     Not on file.     Social History Main Topics     Smoking status: Never Smoker     Smokeless tobacco: Never Used     Alcohol use No     Drug use: No     Sexual activity: Not on file     Other Topics Concern     Not on file     Social History Narrative       FAMILY HISTORY: (I personally reviewed this history with the patient at today's visit)  No family history on file.   No pertinent GI family history no first-degree relatives with GI cancers.      ROS:    No fevers or chills  No weight loss  No blurry vision, double vision or change in vision  No sore throat  No lymphadenopathy  No headache, paraesthesias, or weakness in a limb  No shortness of breath or wheezing  No chest  "pain or pressure  No arthralgias or myalgias  No rashes or skin changes  No odynophagia or dysphagia  No BRBPR, hematochezia, melena  No dysuria, frequency or urgency  No hot/cold intolerance or polyria  No anxiety or depression  PHYSICAL EXAMINATION:  Constitutional: aaox3, cooperative, pleasant, not dyspneic/diaphoretic, no acute distress  Vitals reviewed: /74  Pulse 66  Ht 1.664 m (5' 5.5\")  Wt 66 kg (145 lb 9.6 oz)  SpO2 100%  BMI 23.86 kg/m2  Wt:   Wt Readings from Last 2 Encounters:   09/25/18 66 kg (145 lb 9.6 oz) (76 %)*   03/12/18 69.8 kg (153 lb 14.1 oz) (84 %)*     * Growth percentiles are based on [facility] 2-20 Years data.      Eyes: Sclera anicteric/injected  Ears/nose/mouth/throat: Normal oropharynx without ulcers or exudate, mucus membranes moist, hearing intact  Neck: supple, thyroid normal size  CV: No edema  Respiratory: Unlabored breathing  Lymph: No submandibular, supraclavicular or inguinal lymphadenopathy  Abd:Nondistended, no masses, +bs, no hepatosplenomegaly, nontender, no peritoneal signs  Skin: warm, perfused, no jaundice  Psych: Normal affect, poor eye contact  MSK: Normal gait      PERTINENT STUDIES: (I personally reviewed these laboratory studies today)  Most recent CBC:   Recent Labs   Lab Test  09/25/18   1338  11/02/16   1428   WBC  6.2  8.1   HGB  13.6  14.4   HCT  40.3  41.4   PLT  236  260     Most recent hepatic panel:  Recent Labs   Lab Test  09/25/18   1338   ALT  34   AST  26       ASSESSMENT/PLAN:    Claudia Galdamez is a 17 year old female who presents for further evaluation of chronic nausea and symptoms of dyspepsia and constipation.  The symptoms are long-standing but have worsened since her being off at college and she is currently out of school.  There are no red flag signs of lab abnormalities, no large documented weight losses, no hematemesis, hematochezia or melena, and I do not suspect a serious underlying process.  Her symptoms described are that of dyspepsia " and chronic constipation.  I think that the most likely diagnosis is functional dyspepsia and I relayed that to the patient and her mother today.  We will obtains noninvasive workup including basic labs, liver tests, abdominal ultrasound and a gastric emptying scan given chronic nausea.  If these are unremarkable, I explained that the treatment primarily involves a multidisciplinary approach including nutrition, GI psychology, and possibly medication management.  Her needs include involving her primary care physician through this process which she appears to currently be neglecting as she notes that she typically goes to urgent care rather than see a primary care doctor.  Psychology may also be helpful for the patient to help take control of her healthcare, which her mother appears to currently dominating.  May also need to consider ENT evaluation for recurrent strep throat given patient reports receiving several courses of antibiotics per year for this issue.  Recurrent ABX use for her may be contributing to current GI symptoms but is only one of the pieces in what is most likely a multifactorial process.    Additional evaluation is required at the present time.     Abdominal pain, epigastric  Nausea  Orders Placed This Encounter   Procedures     US Abdomen Complete     NM Gastric Emptying     Hepatic panel     CBC with platelets differential     Amylase     Lipase     H Pylori antigen stool     RTC 2 months    Thank you for this consultation.  It was a pleasure to participate in the care of this patient; please contact us with any further questions.      This note was created with voice recognition software, and while reviewed for accuracy, typos may remain.     Ralph Orr MD  Adjunct  of Medicine  Division of Gastroenterology, Hepatology and Nutrition  [facility]  [phone]

## 2018-09-28 ENCOUNTER — TELEPHONE (OUTPATIENT)
Dept: GASTROENTEROLOGY | Facility: CLINIC | Age: 18
End: 2018-09-28

## 2018-09-28 NOTE — TELEPHONE ENCOUNTER
Nurse called and updated patient on normal US and lab results, she verbalized understanding and will continue with other testing.    Mendy Chaudhary RN, BSN, PHN  [facility]  GI/Gen Surg/Hepatology Care Coordinator

## 2018-09-28 NOTE — TELEPHONE ENCOUNTER
----- Message from Ralph Orr MD sent at 9/27/2018 11:01 AM CDT -----  Please let the patient know about US result.  Thanks, -Roney

## 2018-10-02 ENCOUNTER — HOSPITAL ENCOUNTER (OUTPATIENT)
Dept: NUCLEAR MEDICINE | Facility: CLINIC | Age: 18
Setting detail: NUCLEAR MEDICINE
Discharge: HOME | End: 2018-10-02
Attending: INTERNAL MEDICINE | Admitting: INTERNAL MEDICINE
Payer: COMMERCIAL

## 2018-10-02 DIAGNOSIS — R11.0 NAUSEA: ICD-10-CM

## 2018-10-02 PROCEDURE — 78264 GASTRIC EMPTYING IMG STUDY: CPT

## 2018-10-02 NOTE — LETTER
Ms.Kathryn Galdamez  [address] [facility] [address]        October 15, 2018    Dear ,    We are writing to inform you that your gastric emptying study was normal.    Please continue follow up as scheduled and call our clinic with any concerns [phone].    Thank you!    Ralph Orr MD       Resulted Orders   NM Gastric Emptying    Narrative    EXAM:  NM GASTRIC EMPTYING, 10/2/2018 12:52 PM  HISTORY: nausea; Nausea    TECHNIQUE: The patient ingested 2 mCi of Tc-99m sulfur colloid in  2  eggs. No toast and jelly was used. Static images were acquired at  approximately 1 hour intervals out through 4 hours using a dual head  gamma camera in an anterior and posterior position. The calculation of  emptying was based on dual head imaging with geometric mean  calculations.  A Linear square fit was calculated to the emptying  curve to determine the amount of residual activity at each time point.    FINDINGS:   Percent retention at 60 min = 55%.  Percent retention at 120 min = 11%.  Percent retention at 180 min = 2%.  Percent retention at 240 min =nil    T 1/2 emptying time =  69 mins.      Impression    IMPRESSION: Normal gastric emptying {see normal ranges below}.  =====================  Reference Range:  Gastric emptying  - 30 minutes: <70% of retention (> 30% emptying) suggests abnormally     fast emptying.  - 60 minutes: <90% retention (>10% emptying) is normal; less than 30%     retention (>70% emptying) suggests abnormally rapid emptying.  - 90 minutes: <65% retention (> 35% emptying) is normal.  - 120 minutes: <60% retention (> 40% emptying) is normal.  - 180 minutes: <30% retention (> 70% emptying) is normal.    Gastric emptying T-1/2:  Solid: The normal range is  minutes  Liquid only: Normal range is 10-45 minutes.  Liquid only-children: At 60 minutes, normal range is 44-58 % .  Liquid only-infants: At 60 minutes, normal range is 32-64 %.      I have personally  reviewed the examination and initial interpretation  and I agree with the findings.    ROSEANNE MEREDITH MD

## 2018-10-15 NOTE — ADDENDUM NOTE
Encounter addended by: Mendy Chaudhary RN on: 10/15/2018 10:02 AM<BR>     Actions taken: Letter status changed

## 2018-12-04 ENCOUNTER — OFFICE VISIT (OUTPATIENT)
Dept: GASTROENTEROLOGY | Facility: CLINIC | Age: 18
End: 2018-12-04
Payer: COMMERCIAL

## 2018-12-04 VITALS
DIASTOLIC BLOOD PRESSURE: 71 MMHG | BODY MASS INDEX: 22.39 KG/M2 | OXYGEN SATURATION: 100 % | WEIGHT: 139.3 LBS | HEART RATE: 84 BPM | RESPIRATION RATE: 18 BRPM | SYSTOLIC BLOOD PRESSURE: 112 MMHG | HEIGHT: 66 IN

## 2018-12-04 DIAGNOSIS — K21.9 GASTROESOPHAGEAL REFLUX DISEASE WITHOUT ESOPHAGITIS: Primary | ICD-10-CM

## 2018-12-04 PROCEDURE — 99214 OFFICE O/P EST MOD 30 MIN: CPT | Performed by: INTERNAL MEDICINE

## 2018-12-04 ASSESSMENT — PAIN SCALES - GENERAL: PAINLEVEL: MILD PAIN (3)

## 2018-12-04 NOTE — NURSING NOTE
"Claudia Galdamez's goals for this visit include:   Chief Complaint   Patient presents with     Gastrointestinal Problem     8 week follow up       She requests these members of her care team be copied on today's visit information: Yes    PCP: [facility]    Referring Provider:  No referring provider defined for this encounter.    /71 (BP Location: Left arm, Patient Position: Sitting, Cuff Size: Adult Large)  Pulse 84  Resp 18  Ht 1.664 m (5' 5.5\")  Wt 63.2 kg (139 lb 4.8 oz)  SpO2 100%  BMI 22.83 kg/m2    Do you need any medication refills at today's visit? No    Peng Feldman CMA      "

## 2018-12-04 NOTE — MR AVS SNAPSHOT
After Visit Summary   12/4/2018    Claudia Galdamez    MRN: [id number]           Patient Information     Date Of Birth          2000        Visit Information        Provider Department      12/4/2018 11:00 AM Ralph Orr MD [facility]        Today's Diagnoses     Gastroesophageal reflux disease without esophagitis    -  1      Care Instructions      Take 1 scoop per day.  Take miralax every other day.    Increase Nexium to twice daily for 8 weeks.          Follow-ups after your visit        Follow-up notes from your care team     Return in about 3 months (around 3/4/2019).      Your next 10 appointments already scheduled     Mar 19, 2019 12:30 PM CDT   Return Visit with Ralph Orr MD   [facility]    [address] [place] [id number] [phone]              Who to contact     If you have questions or need follow up information about today's clinic visit or your schedule please contact [facility] directly at [phone].  Normal or non-critical lab and imaging results will be communicated to you by Sentrigohart, letter or phone within 4 business days after the clinic has received the results. If you do not hear from us within 7 days, please contact the clinic through [employer] or phone. If you have a critical or abnormal lab result, we will notify you by phone as soon as possible.  Submit refill requests through PlayerPro or call your pharmacy and they will forward the refill request to us. Please allow 3 business days for your refill to be completed.          Additional Information About Your Visit        Sentrigohart Information     PlayerPro is an electronic gateway that provides easy, online access to your medical records. With PlayerPro, you can request a clinic appointment, read your test results, renew a prescription or communicate with your care team.     To sign up for  "Jarrett visit the website at [email or web address]   You will be asked to enter the access code listed below, as well as some personal information. Please follow the directions to create your username and password.     Your access code is: [email or web address]  Expires: 2018 12:30 PM     Your access code will  in 90 days. If you need help or a new code, please contact your [facility] or call [phone] for assistance.        Care EveryWhere ID     This is your Care EveryWhere ID. This could be used by other organizations to access your [facility] medical records  [id number]        Your Vitals Were     Pulse Respirations Height Pulse Oximetry BMI (Body Mass Index)       84 18 1.664 m (5' 5.5\") 100% 22.83 kg/m2        Blood Pressure from Last 3 Encounters:   18 112/71   18 112/74   18 122/90    Weight from Last 3 Encounters:   18 63.2 kg (139 lb 4.8 oz)   18 66 kg (145 lb 9.6 oz) (76 %)*   18 69.8 kg (153 lb 14.1 oz) (84 %)*     * Growth percentiles are based on [facility] 2-20 Years data.              Today, you had the following     No orders found for display         Today's Medication Changes          These changes are accurate as of 18 11:59 AM.  If you have any questions, ask your nurse or doctor.               These medicines have changed or have updated prescriptions.        Dose/Directions    * ESOMEPRAZOLE MAGNESIUM PO   This may have changed:  Another medication with the same name was added. Make sure you understand how and when to take each.   Changed by:  Ralph Orr MD        Refills:  0       * esomeprazole 20 MG DR capsule   Commonly known as:  nexIUM   This may have changed:  You were already taking a medication with the same name, and this prescription was added. Make sure you understand how and when to take each.   Used for:  Gastroesophageal reflux disease without esophagitis   Changed by:  Dominga" Ralph Christine MD        Dose:  20 mg   Take 1 capsule (20 mg) by mouth 2 times daily (before meals) Take 30-60 minutes before eating.   Quantity:  60 capsule   Refills:  3       * Notice:  This list has 2 medication(s) that are the same as other medications prescribed for you. Read the directions carefully, and ask your doctor or other care provider to review them with you.         Where to get your medicines      These medications were sent to [facility] [address] [place] [address] [place] [address] [place] [address] [address] [place] [address]     Phone:  [phone]     esomeprazole 20 MG DR capsule                Primary Care Provider Fax #    [facility] [phone]       [address] [place] [address]        Equal Access to Services     NEGRA GAN : Hugo Robertson, waaxshanta luqadaha, qaybta kaalmada chilo, bria olivares. So [facility] [phone].    ATENCIÓN: Si habla español, tiene a fernandes disposición servicios gratuitos de asistencia lingüística. Leo al [phone].    We comply with applicable federal civil rights laws and [place] laws. We do not discriminate on the basis of race, color, national origin, age, disability, sex, sexual orientation, or gender identity.            Thank you!     Thank you for choosing [facility]  for your care. Our goal is always to provide you with excellent care. Hearing back from our patients is one way we can continue to improve our services. Please take a few minutes to complete the written survey that you may receive in the mail after your visit with us. Thank you!             Your Updated Medication List - Protect others around you: Learn how to safely use, store and throw away your medicines at www.disposemymeds.org.          This list is accurate as of 12/4/18 11:59 AM.  Always use your most recent med list.                   Brand Name Dispense  Instructions for use Diagnosis    ALLEGRA PO      Take 180 mg by mouth daily as needed        * ESOMEPRAZOLE MAGNESIUM PO           * esomeprazole 20 MG DR capsule    nexIUM    60 capsule    Take 1 capsule (20 mg) by mouth 2 times daily (before meals) Take 30-60 minutes before eating.    Gastroesophageal reflux disease without esophagitis       polyethylene glycol powder    MIRALAX    500 g    Take 17 g (1 capful) by mouth daily    Slow transit constipation, Gastroesophageal reflux disease without esophagitis       SINGULAIR PO      Take by mouth At Bedtime        * Notice:  This list has 2 medication(s) that are the same as other medications prescribed for you. Read the directions carefully, and ask your doctor or other care provider to review them with you.

## 2018-12-04 NOTE — PATIENT INSTRUCTIONS
Take 1 scoop per day.  Take miralax every other day.    Increase Nexium to twice daily for 8 weeks.

## 2018-12-06 NOTE — PROGRESS NOTES
GASTROENTEROLOGY FOLLOW UP CLINIC VISIT    CC/REFERRING MD:    [facility]    REASON FOR CONSULTATION:   No ref. provider found for   Chief Complaint   Patient presents with     Gastrointestinal Problem     8 week follow up     HISTORY OF PRESENT ILLNESS:    Claudia Galdamez is 18 year old female who presents for follow up of dyspepsia and constipation.  She was a long-standing patient of pediatric GI for several years before transitioning to adult care with myself September 2018.  She had been noted to have increased symptoms of nausea, abdominal pain and constipation at the time of the last visit and had a reported 20 pound weight loss.  She had taken a break from college due to the symptoms.  At the time of the last visit, she was felt to have a likely functional dyspepsia/functional abdominal pain syndrome along with constipation.  She has been noted to have prior EGD and colonoscopy from 2016 which were normal.  At the time of the last visit, we did recommend proceeding with labs, abdominal ultrasound and gastric emptying scan these all returned normal..      Since the time of the last visit, she does note that her symptoms have improved.  She is currently taking MiraLAX once daily and notes that she is having soft stools with this.  She is planning to go back to college for next semester.  She is noted to be down 7 pounds since the time of the last office visit.  She notes that she continues to have symptoms of GERD, nausea and belching.  She is on Nexium once daily.  The patient and her mother are concerned as there is a family history of esophageal cancer in grandmother as well as other family members that have had Cash esophagus in the past.        PERTINENT PAST MEDICAL HISTORY:    Past Medical History:   Diagnosis Date     Constipation      Eructation      Slow transit constipation        PREVIOUS SURGERIES:   Past Surgical History:   Procedure Laterality Date     COLONOSCOPY N/A  "12/2/2016    Procedure: COMBINED COLONOSCOPY, SINGLE OR MULTIPLE BIOPSY/POLYPECTOMY BY BIOPSY;  Surgeon: Yani Bella MD;  Location: [facility]      ESOPHAGOSCOPY, GASTROSCOPY, DUODENOSCOPY (EGD), COMBINED N/A 12/2/2016    Procedure: COMBINED ESOPHAGOSCOPY, GASTROSCOPY, DUODENOSCOPY (EGD), BIOPSY SINGLE OR MULTIPLE;  Surgeon: Yani Bella MD;  Location: [facility]        ALLERGIES:     Allergies   Allergen Reactions     Lentil Anaphylaxis     Peas Anaphylaxis     Cats      Cumin Oil      Nutmeg Oil (Myristica Oil)      Nuts      Seasonal Allergies        PERTINENT MEDICATIONS:    Current Outpatient Prescriptions:      esomeprazole (NEXIUM) 20 MG DR capsule, Take 1 capsule (20 mg) by mouth 2 times daily (before meals) Take 30-60 minutes before eating., Disp: 60 capsule, Rfl: 3     ESOMEPRAZOLE MAGNESIUM PO, , Disp: , Rfl:      Fexofenadine HCl (ALLEGRA PO), Take 180 mg by mouth daily as needed , Disp: , Rfl:      Montelukast Sodium (SINGULAIR PO), Take by mouth At Bedtime , Disp: , Rfl:      polyethylene glycol (MIRALAX) powder, Take 17 g (1 capful) by mouth daily, Disp: 500 g, Rfl: 1        ROS:    No fevers or chills  No weight gain  No blurry vision, double vision or change in vision  No sore throat  No lymphadenopathy  No headache, paraesthesias, or weakness in a limb  No shortness of breath or wheezing  No chest pain or pressure  No arthralgias or myalgias  No rashes or skin changes  No odynophagia or dysphagia  No BRBPR, hematochezia, melena  No dysuria, frequency or urgency  No hot/cold intolerance or polyria  No anxiety or depression  PHYSICAL EXAMINATION:  Constitutional: aaox3, cooperative, pleasant, not dyspneic/diaphoretic, no acute distress  Vitals reviewed: /71 (BP Location: Left arm, Patient Position: Sitting, Cuff Size: Adult Large)  Pulse 84  Resp 18  Ht 1.664 m (5' 5.5\")  Wt 63.2 kg (139 lb 4.8 oz)  SpO2 100%  BMI 22.83 kg/m2  Wt:   Wt " Readings from Last 2 Encounters:   12/04/18 63.2 kg (139 lb 4.8 oz)   09/25/18 66 kg (145 lb 9.6 oz) (76 %)*     * Growth percentiles are based on [facility] 2-20 Years data.      Eyes: Sclera anicteric/injected  Ears/nose/mouth/throat: Normal oropharynx without ulcers or exudate, mucus membranes moist, hearing intact  Neck: supple, thyroid normal size  CV: No edema  Respiratory: Unlabored breathing  Lymph: No submandibular, supraclavicular or inguinal lymphadenopathy  Abd:  Nondistended, no masses, +bs, no hepatosplenomegaly, nontender, no peritoneal signs  Skin: warm, perfused, no jaundice  Psych: Normal affect  MSK: Normal gait      PERTINENT STUDIES:   Most recent CBC:  Recent Labs   Lab Test  09/25/18   1338  11/02/16   1428   WBC  6.2  8.1   HGB  13.6  14.4   HCT  40.3  41.4   PLT  236  260     Most recent hepatic panel:  Recent Labs   Lab Test  09/25/18   1338   ALT  34   AST  26     Most recent creatinine:  No lab results found.      ASSESSMENT/PLAN:    Claudia Galdamez is a 18 year old female who presents for follow up of dyspepsia, abdominal pain, and constipation.    GERD and dyspepsia: She continues to have symptoms of reflux as well as dyspepsia with once daily PPI therapy.  She is noted to have negative upper endoscopy from 2016.  I do not think that an additional endoscopy at this point is warranted.  PH/impedance testing was considered, though I do not think that she is a good candidate for a surgery for this issue and therefore I would first recommend increasing to twice daily therapy to see if this provides any significant relief.  This may all be due to a functional disorder and they can have suggested that she could potentially benefit from neuromodulator therapy and/or GI psychology for cognitive behavioral therapy training.  The patient would like to increase to twice daily PPI and monitor for symptom improvement.  If symptoms fail to improve, then she is open to the idea of neuromodulation plus GI  psychology.    Chronic constipation: This could potentially be contributing to her upper GI symptoms and abdominal pain.  She is concerned about her stool becoming too loose with MiraLAX.  I have suggested that she initiate Citrucel 1 scoop daily and change her MiraLAX to every other day dosing.    Gastroesophageal reflux disease without esophagitis  No orders of the defined types were placed in this encounter.      RTC 3 months with Singh.    Thank you for this consultation.  It was a pleasure to participate in the care of this patient; please contact us with any further questions.     This note was created with voice recognition software, and while reviewed for accuracy, typos may remain.     Ralph Orr MD  Adjunct  of Medicine  Division of Gastroenterology, Hepatology and Nutrition  [facility]  [phone]

## 2019-07-09 ENCOUNTER — TELEPHONE (OUTPATIENT)
Dept: GASTROENTEROLOGY | Facility: CLINIC | Age: 19
End: 2019-07-09

## 2019-07-09 DIAGNOSIS — K21.9 GASTROESOPHAGEAL REFLUX DISEASE WITHOUT ESOPHAGITIS: ICD-10-CM

## 2019-07-09 NOTE — TELEPHONE ENCOUNTER
Faxed refill request from: Lisseth Singh  Medication request: Esomeprazole magnesium 20mg   Sig: Take 1 capsule by mouth twice daily 30-60 minutes before eating  Last filled: 6/5/19  Last Qty: 60  Pt's last office visit: 12/4/18  Next scheduled office visit: None scheduled, Pt due for follow-up prior to more refills being given    Called pt to inform follow-up appointment needs to be scheduled prior to refills being given. No answer, left message asking pt to return call to clinic.    Meena Ames LPN

## 2019-07-12 NOTE — TELEPHONE ENCOUNTER
Writer spoke with the patient. Follow up appointment made with Xavi Winters PA-C. Patient stated she feels better when taking the medication. Questioning if follow up appointment is needed. Writer informed the patient she should follow up and discuss with the provider regarding extending her follow up appointments.    Prescription will be sent to the pharmacy.     ANTOLIN Tyson

## 2019-07-24 ENCOUNTER — OFFICE VISIT (OUTPATIENT)
Dept: GASTROENTEROLOGY | Facility: CLINIC | Age: 19
End: 2019-07-24
Payer: COMMERCIAL

## 2019-07-24 VITALS
DIASTOLIC BLOOD PRESSURE: 81 MMHG | HEIGHT: 66 IN | SYSTOLIC BLOOD PRESSURE: 119 MMHG | WEIGHT: 142.4 LBS | OXYGEN SATURATION: 98 % | HEART RATE: 99 BPM | BODY MASS INDEX: 22.88 KG/M2

## 2019-07-24 DIAGNOSIS — K21.9 GERD WITHOUT ESOPHAGITIS: Primary | ICD-10-CM

## 2019-07-24 DIAGNOSIS — K59.01 SLOW TRANSIT CONSTIPATION: ICD-10-CM

## 2019-07-24 PROCEDURE — 99213 OFFICE O/P EST LOW 20 MIN: CPT | Performed by: PHYSICIAN ASSISTANT

## 2019-07-24 RX ORDER — SPIRONOLACTONE 50 MG/1
150 TABLET, FILM COATED ORAL DAILY
Refills: 3 | COMMUNITY
Start: 2019-07-15

## 2019-07-24 RX ORDER — CLINDAMYCIN PHOSPHATE 11.9 MG/ML
SOLUTION TOPICAL
Refills: 6 | COMMUNITY
Start: 2019-07-08

## 2019-07-24 RX ORDER — DAPSONE 75 MG/G
GEL TOPICAL
Refills: 8 | COMMUNITY
Start: 2019-01-28

## 2019-07-24 RX ORDER — TRETINOIN 1 MG/G
CREAM TOPICAL
Refills: 11 | COMMUNITY
Start: 2019-06-19

## 2019-07-24 ASSESSMENT — MIFFLIN-ST. JEOR: SCORE: 1424.73

## 2019-07-24 ASSESSMENT — PAIN SCALES - GENERAL: PAINLEVEL: NO PAIN (0)

## 2019-07-24 NOTE — PROGRESS NOTES
GASTROENTEROLOGY FOLLOW UP CLINIC VISIT    CC/REFERRING MD:    [facility]    REASON FOR CONSULTATION:  RECHECK (Patient is here for a follow up and refill on Nexium)      HISTORY OF PRESENT ILLNESS:    Claudia Galdamez is 18 year old female who presents for follow up of reflux and constipation. She was a long standing patient of pediatric GI for several years before transitioning to adult GI with Dr. Orr in September 2018. She was seen for symptoms of nausea, abdominal pain and constipation. She had previous EGD and colonoscopy in 2016 which were normal. She was further evaluated in our adult GI clinic with labs, abdominal ultrasound and a 4 hr gastric emptying scan which were normal. At her last visit, her nexium was increased to 20mg twice daily. She did this for several weeks and noted an improvement. She currently takes nexium 20mg every morning and an additional nexium in the evening about three nights a week. She also feels her constipation is controlled with miralax daily. She was advised to switch to alternate between citrucel and miralax daily at her last visit since she was worried about developing looser stools.     No nausea or vomiting at this time. No abdominal pain.     There is a family hx of esophageal cancer in her grandmother and barretts esophagus in other family members.     PERTINENT PAST MEDICAL HISTORY:    Past Medical History:   Diagnosis Date     Constipation      Eructation      Slow transit constipation        PREVIOUS SURGERIES:   Past Surgical History:   Procedure Laterality Date     COLONOSCOPY N/A 12/2/2016    Procedure: COMBINED COLONOSCOPY, SINGLE OR MULTIPLE BIOPSY/POLYPECTOMY BY BIOPSY;  Surgeon: Yani Bella MD;  Location: [facility]      ESOPHAGOSCOPY, GASTROSCOPY, DUODENOSCOPY (EGD), COMBINED N/A 12/2/2016    Procedure: COMBINED ESOPHAGOSCOPY, GASTROSCOPY, DUODENOSCOPY (EGD), BIOPSY SINGLE OR MULTIPLE;  Surgeon:  "Yani Bella MD;  Location:  PEDS SEDATION        ALLERGIES:     Allergies   Allergen Reactions     Lentil Anaphylaxis     Peas Anaphylaxis     Cats      Cumin Oil      Nutmeg Oil (Myristica Oil)      Nuts      Seasonal Allergies        PERTINENT MEDICATIONS:    Current Outpatient Medications:      ACZONE 7.5 % gel, PHILIPP TOPICALLY TO FACE D, Disp: , Rfl: 8     clindamycin (CLEOCIN T) 1 % external solution, APPLY TO SPOTS AS NEEDED QAM, Disp: , Rfl: 6     esomeprazole (NEXIUM) 20 MG DR capsule, Take 1 capsule (20 mg) by mouth 2 times daily (before meals) Take 30-60 minutes before eating., Disp: 60 capsule, Rfl: 0     Fexofenadine HCl (ALLEGRA PO), Take 180 mg by mouth daily as needed , Disp: , Rfl:      Montelukast Sodium (SINGULAIR PO), Take by mouth At Bedtime , Disp: , Rfl:      polyethylene glycol (MIRALAX) powder, Take 17 g (1 capful) by mouth daily, Disp: 500 g, Rfl: 1     spironolactone (ALDACTONE) 100 MG tablet, TK 1 T PO QD, Disp: , Rfl: 3     tretinoin (RETIN-A) 0.1 % external cream, APPLY A PEA-SIZED AMOUNT TOPICALLY TO FACE NIGHTLY AS TOLERATED., Disp: , Rfl: 11     ESOMEPRAZOLE MAGNESIUM PO, , Disp: , Rfl:     FAMILY HISTORY:   No family history on file.        ROS:    No fevers or chills  No weight loss  No blurry vision, double vision or change in vision  No sore throat  No shortness of breath or wheezing  No chest pain or pressure  No arthralgias or myalgias  No rashes or skin changes  No odynophagia or dysphagia  No BRBPR, hematochezia, melena  No dysuria, frequency or urgency  No hot/cold intolerance or polyria  No anxiety or depression  PHYSICAL EXAMINATION:  Constitutional: aaox3, cooperative, pleasant, not dyspneic/diaphoretic, no acute distress  Vitals reviewed: /81 (BP Location: Left arm, Patient Position: Sitting, Cuff Size: Adult Regular)   Pulse 99   Ht 1.664 m (5' 5.5\")   Wt 64.6 kg (142 lb 6.4 oz)   SpO2 98%   BMI 23.34 kg/m     Wt:   Wt Readings from Last 2 " Encounters:   07/24/19 64.6 kg (142 lb 6.4 oz)   12/04/18 63.2 kg (139 lb 4.8 oz)          Eyes: Sclera anicteric/injected  Ears/nose/mouth/throat: Normal oropharynx without ulcers or exudate, mucus membranes moist, hearing intact  Neck: supple  CV: No edema  Respiratory: Unlabored breathing  Abd:Nondistended, no masses, +bs, no hepatosplenomegaly, nontender, no peritoneal signs  Skin: warm, perfused, no jaundice  Psych: Normal affect  MSK: Normal gait      PERTINENT STUDIES:   Most recent CBC:  Recent Labs   Lab Test 09/25/18  1338 11/02/16  1428   WBC 6.2 8.1   HGB 13.6 14.4   HCT 40.3 41.4    260     Most recent hepatic panel:  Recent Labs   Lab Test 09/25/18  1338   ALT 34   AST 26         RADIOLOGY:      EXAM:  NM GASTRIC EMPTYING, 10/2/2018 12:52 PM  HISTORY: nausea; Nausea     TECHNIQUE: The patient ingested 2 mCi of Tc-99m sulfur colloid in  2  eggs. No toast and jelly was used. Static images were acquired at  approximately 1 hour intervals out through 4 hours using a dual head  gamma camera in an anterior and posterior position. The calculation of  emptying was based on dual head imaging with geometric mean  calculations.  A Linear square fit was calculated to the emptying  curve to determine the amount of residual activity at each time point.     FINDINGS:   Percent retention at 60 min = 55%.  Percent retention at 120 min = 11%.  Percent retention at 180 min = 2%.  Percent retention at 240 min =nil     T 1/2 emptying time =  69 mins.                                                                      IMPRESSION: Normal gastric emptying  =====================  Reference Range:  Gastric emptying  - 30 minutes: <70% of retention (> 30% emptying) suggests abnormally     fast emptying.  - 60 minutes: <90% retention (>10% emptying) is normal; less than 30%     retention (>70% emptying) suggests abnormally rapid emptying.  - 90 minutes: <65% retention (> 35% emptying) is normal.  - 120 minutes: <60%  retention (> 40% emptying) is normal.  - 180 minutes: <30% retention (> 70% emptying) is normal.     Gastric emptying T-1/2:  Solid: The normal range is  minutes  Liquid only: Normal range is 10-45 minutes.  Liquid only-children: At 60 minutes, normal range is 44-58 % .  Liquid only-infants: At 60 minutes, normal range is 32-64 %.        I have personally reviewed the examination and initial interpretation  and I agree with the findings.     ROSEANNE MEREDITH MD      EXAMINATION: US ABDOMEN COMPLETE,  9/26/2018 9:39 AM      COMPARISON: None available     HISTORY: Epigastric abdominal pain     TECHNIQUE: The abdomen was scanned in standard fashion with  specialized ultrasound transducer(s) using both gray-scale and limited  color Doppler techniques.     Findings:  Liver: The liver demonstrates normal homogeneous echotexture. No  evidence of a focal hepatic mass. Liver measures approximately 14.6 cm  in craniocaudal dimension. The main portal vein is patent with  antegrade flow, measuring 0.9 cm.     Gallbladder:  There is no wall thickening, pericholecystic fluid,  sonographic Moctezuma's sign or evidence of cholelithiasis.     Bile Ducts: Both the intra- and extrahepatic biliary system are of  normal caliber.  The common bile duct measures 2 mm in diameter.     Pancreas: Visualized portions of the head and body of the pancreas are  unremarkable.      Kidneys: Both kidneys are of normal echotexture, without mass or  hydronephrosis.   The craniocaudal dimensions are: right- 8.8 cm,  left- 10.3 cm.     Spleen: The spleen is normal in size,  measuring 10.5 cm in sagittal  dimension.     Aorta and IVC: The visualized portions of the aorta and IVC are  unremarkable. The proximal aorta measures 1.7 cm in diameter and the  IVC measures 1.9 cm in diameter.     Fluid: No evidence of ascites or pleural effusions.                                                                      Impression:   Normal abdominal  ultrasound.     MIN RAMÍREZ MD        ASSESSMENT/PLAN:    Claudia Galdamez is a 18 year old female who presents for follow up of gerd and constipation. Her symptoms are currently well controlled on nexium 20mg every morning and occasional nexium 20mg every evening.  She finds that she needs an additional dose of Nexium in the evening about 3 times a week.  We would like to keep her on lowest dose of Nexium necessary.  Advised patient to continue Nexium 20 mg every morning and add in Zantac 150 mg every evening or at bedtime as needed or daily.     In regards to her constipation, she finds that her symptoms are improved with miralax nightly. She was advised that she can alternate between citrucel and miralax if concerned for developing looser stools. Same recommendations given today, however if she finds miralax is working well she can continue this nightly.     Follow up 6 months to 1 year, sooner if needed for any worsening or concerning symptoms.     GERD without esophagitis  - ranitidine (ZANTAC) 150 MG tablet  Dispense: 90 tablet; Refill: 1  Slow transit constipation        Thank you for this consultation.  It was a pleasure to participate in the care of this patient; please contact us with any further questions.     This note was created with voice recognition software, and while reviewed for accuracy, typos may remain.     Xavi Winters PA-C  Gastroenterology   [facility]

## 2019-07-24 NOTE — NURSING NOTE
"Claudia Galdamez's goals for this visit include:   Chief Complaint   Patient presents with     RECHECK     Patient is here for a follow up and refill on Nexium       She requests these members of her care team be copied on today's visit information: No, patient states she does not have a PCP or primary clinic. Patient is aware that she should establish care with a PCP.    PCP: None    Referring Provider:  No referring provider defined for this encounter.    /81 (BP Location: Left arm, Patient Position: Sitting, Cuff Size: Adult Regular)   Pulse 99   Ht 1.664 m (5' 5.5\")   Wt 64.6 kg (142 lb 6.4 oz)   SpO2 98%   BMI 23.34 kg/m      Do you need any medication refills at today's visit? Yes, Nexium    Anh Rodriguez LPN      "

## 2019-07-24 NOTE — PATIENT INSTRUCTIONS
Continue nexium 20 mg every morning. Add in zantac 150 mg every evening/bedtime as needed but can also be daily.     Start taking citrucel 1 scoop a day for constipation. Miralax every other day

## 2019-07-25 ENCOUNTER — TELEPHONE (OUTPATIENT)
Dept: GASTROENTEROLOGY | Facility: CLINIC | Age: 19
End: 2019-07-25

## 2019-07-25 NOTE — TELEPHONE ENCOUNTER
7/25 called and left  Voicemail. Return in about 6 months (around 1/24/2020).for a follow up appointment. Instructed patient to call [phone] to schedule.     Louann Bell   Procedure    Ortho/Sports Med/Ent/Eye   [facility]   [phone]

## 2019-10-08 DIAGNOSIS — K21.9 GASTROESOPHAGEAL REFLUX DISEASE WITHOUT ESOPHAGITIS: ICD-10-CM

## 2019-10-08 NOTE — TELEPHONE ENCOUNTER
Faxed refill request from: [facility]  Medication request: Esomeprazole 20 mg  Sig: Take 1 capsule by mouth twice daily. Take 30-60 minutes before eating  Last filled: 7/17/2019  Last Qty: 60  Pt's last office visit: 7/24/2019  Next scheduled office visit: None    Rx pended and routed to RNCC for review and approval if appropriate.    Anh Rodriguez LPN

## 2019-12-05 ENCOUNTER — OFFICE VISIT (OUTPATIENT)
Dept: GASTROENTEROLOGY | Facility: CLINIC | Age: 19
End: 2019-12-05
Payer: COMMERCIAL

## 2019-12-05 VITALS
HEART RATE: 72 BPM | DIASTOLIC BLOOD PRESSURE: 71 MMHG | HEIGHT: 66 IN | OXYGEN SATURATION: 99 % | BODY MASS INDEX: 22.1 KG/M2 | WEIGHT: 137.5 LBS | SYSTOLIC BLOOD PRESSURE: 111 MMHG

## 2019-12-05 DIAGNOSIS — K59.01 SLOW TRANSIT CONSTIPATION: ICD-10-CM

## 2019-12-05 DIAGNOSIS — K21.9 GASTROESOPHAGEAL REFLUX DISEASE WITHOUT ESOPHAGITIS: Primary | ICD-10-CM

## 2019-12-05 DIAGNOSIS — J32.9 FREQUENT SINUS INFECTIONS: ICD-10-CM

## 2019-12-05 PROCEDURE — 99213 OFFICE O/P EST LOW 20 MIN: CPT | Performed by: PHYSICIAN ASSISTANT

## 2019-12-05 RX ORDER — POLYETHYLENE GLYCOL 3350 17 G/17G
1 POWDER, FOR SOLUTION ORAL DAILY
Qty: 90 PACKET | Refills: 3 | Status: SHIPPED | OUTPATIENT
Start: 2019-12-05

## 2019-12-05 ASSESSMENT — PAIN SCALES - GENERAL: PAINLEVEL: NO PAIN (0)

## 2019-12-05 ASSESSMENT — MIFFLIN-ST. JEOR: SCORE: 1397.51

## 2019-12-05 NOTE — NURSING NOTE
"Claudia Galdamez's goals for this visit include:   Chief Complaint   Patient presents with     RECHECK     Follow up and refill on esomeprazole; Patient states the medication is working well for her       She requests these members of her care team be copied on today's visit information: No, Patient states she does not have a PCP    PCP: No Ref-Primary, Physician    Referring Provider:  No referring provider defined for this encounter.    /71 (BP Location: Left arm, Patient Position: Sitting, Cuff Size: Adult Regular)   Pulse 72   Ht 1.664 m (5' 5.5\")   Wt 62.4 kg (137 lb 8 oz)   SpO2 99%   BMI 22.53 kg/m      Do you need any medication refills at today's visit? Yes, Esomeprazole    Anh Rodriguez LPN      "

## 2019-12-05 NOTE — PROGRESS NOTES
GASTROENTEROLOGY FOLLOW UP CLINIC VISIT    CC/REFERRING MD:    [facility]    REASON FOR CONSULTATION:  RECHECK (Follow up and refill on esomeprazole; Patient states the medication is working well for her)      HISTORY OF PRESENT ILLNESS:    Claudia Galdamez is 18 year old female who presents for follow up of reflux and constipation. She presents today with her mother. She was a previous patient of our pediatric GI clinic and transitioned to adult GI in September 2018. Her previous work up includes labs, abd sono and 4hr GES which were normal. She notes that her heartburn is well controlled when taking Nexium daily as prescribed.  She will have occasional heartburn if she forgets to take medication or if she eats a lot of acidic foods that day.  She does admit to taking Advil and Tylenol recently due to recent concussion.  She denies any abdominal pain.  No nausea or vomiting.  No lack of appetite no change in weight.  She does continue to have constipation and takes MiraLAX daily. She is not currently taking fiber supplements. There is no rectal bleeding or black colored stools.     She does express concerns with frequent sinus infections which have required frequent abx.       PERTINENT PAST MEDICAL HISTORY:    Past Medical History:   Diagnosis Date     Constipation      Eructation      Slow transit constipation        PREVIOUS SURGERIES:   Past Surgical History:   Procedure Laterality Date     COLONOSCOPY N/A 12/2/2016    Procedure: COMBINED COLONOSCOPY, SINGLE OR MULTIPLE BIOPSY/POLYPECTOMY BY BIOPSY;  Surgeon: Yani Bella MD;  Location: [facility]      ESOPHAGOSCOPY, GASTROSCOPY, DUODENOSCOPY (EGD), COMBINED N/A 12/2/2016    Procedure: COMBINED ESOPHAGOSCOPY, GASTROSCOPY, DUODENOSCOPY (EGD), BIOPSY SINGLE OR MULTIPLE;  Surgeon: Yani Bella MD;  Location: [facility]        ALLERGIES:     Allergies   Allergen Reactions     Lentil  "Anaphylaxis     Peas Anaphylaxis     Cats      Cumin Oil      Nutmeg Oil (Myristica Oil)      Nuts      Seasonal Allergies        PERTINENT MEDICATIONS:    Current Outpatient Medications:      ACZONE 7.5 % gel, PHILIPP TOPICALLY TO FACE D, Disp: , Rfl: 8     clindamycin (CLEOCIN T) 1 % external solution, APPLY TO SPOTS AS NEEDED QAM, Disp: , Rfl: 6     esomeprazole (NEXIUM) 20 MG DR capsule, Take 1 capsule (20 mg) by mouth every morning (before breakfast) Take 30-60 minutes before eating., Disp: 60 capsule, Rfl: 0     ESOMEPRAZOLE MAGNESIUM PO, , Disp: , Rfl:      Fexofenadine HCl (ALLEGRA PO), Take 180 mg by mouth daily as needed , Disp: , Rfl:      Montelukast Sodium (SINGULAIR PO), Take by mouth At Bedtime , Disp: , Rfl:      polyethylene glycol (MIRALAX) powder, Take 17 g (1 capful) by mouth daily, Disp: 500 g, Rfl: 1     ranitidine (ZANTAC) 150 MG tablet, Take 1 tablet (150 mg) by mouth At Bedtime, Disp: 90 tablet, Rfl: 1     spironolactone (ALDACTONE) 50 MG tablet, Take 150 mg by mouth daily , Disp: , Rfl: 3     tretinoin (RETIN-A) 0.1 % external cream, APPLY A PEA-SIZED AMOUNT TOPICALLY TO FACE NIGHTLY AS TOLERATED., Disp: , Rfl: 11    FAMILY HISTORY:   No family history on file.        ROS:    No fevers or chills  No weight loss  No blurry vision, double vision or change in vision  +frequent sinus infections   No shortness of breath or wheezing  No chest pain or pressure  No arthralgias or myalgias  No rashes or skin changes  No odynophagia or dysphagia  No BRBPR, hematochezia, melena  No dysuria, frequency or urgency  No hot/cold intolerance or polyria    PHYSICAL EXAMINATION:  Constitutional: aaox3, cooperative, pleasant, not dyspneic/diaphoretic, no acute distress  Vitals reviewed: /71 (BP Location: Left arm, Patient Position: Sitting, Cuff Size: Adult Regular)   Pulse 72   Ht 1.664 m (5' 5.5\")   Wt 62.4 kg (137 lb 8 oz)   SpO2 99%   BMI 22.53 kg/m     Wt:   Wt Readings from Last 2 Encounters: "   12/05/19 62.4 kg (137 lb 8 oz)   07/24/19 64.6 kg (142 lb 6.4 oz)          Eyes: Sclera anicteric/injected  Neck: supple  CV: No edema  Respiratory: Unlabored breathing  Abd:Nondistended, no masses, +bs, no hepatosplenomegaly, nontender, no peritoneal signs  Skin: warm, perfused, no jaundice  Psych: Normal affect  MSK: Normal gait      PERTINENT STUDIES:   Most recent CBC:  Recent Labs   Lab Test 09/25/18  1338 11/02/16  1428   WBC 6.2 8.1   HGB 13.6 14.4   HCT 40.3 41.4    260     Most recent hepatic panel:  Recent Labs   Lab Test 09/25/18  1338   ALT 34   AST 26         RADIOLOGY:      EXAM:  NM GASTRIC EMPTYING, 10/2/2018 12:52 PM  HISTORY: nausea; Nausea     TECHNIQUE: The patient ingested 2 mCi of Tc-99m sulfur colloid in  2  eggs. No toast and jelly was used. Static images were acquired at  approximately 1 hour intervals out through 4 hours using a dual head  gamma camera in an anterior and posterior position. The calculation of  emptying was based on dual head imaging with geometric mean  calculations.  A Linear square fit was calculated to the emptying  curve to determine the amount of residual activity at each time point.     FINDINGS:   Percent retention at 60 min = 55%.  Percent retention at 120 min = 11%.  Percent retention at 180 min = 2%.  Percent retention at 240 min =nil     T 1/2 emptying time =  69 mins.                                                                      IMPRESSION: Normal gastric emptying  =====================  Reference Range:  Gastric emptying  - 30 minutes: <70% of retention (> 30% emptying) suggests abnormally     fast emptying.  - 60 minutes: <90% retention (>10% emptying) is normal; less than 30%     retention (>70% emptying) suggests abnormally rapid emptying.  - 90 minutes: <65% retention (> 35% emptying) is normal.  - 120 minutes: <60% retention (> 40% emptying) is normal.  - 180 minutes: <30% retention (> 70% emptying) is normal.     Gastric emptying  T-1/2:  Solid: The normal range is  minutes  Liquid only: Normal range is 10-45 minutes.  Liquid only-children: At 60 minutes, normal range is 44-58 % .  Liquid only-infants: At 60 minutes, normal range is 32-64 %.        I have personally reviewed the examination and initial interpretation  and I agree with the findings.     ROSEANNE MEREDITH MD      EXAMINATION: US ABDOMEN COMPLETE,  9/26/2018 9:39 AM      COMPARISON: None available     HISTORY: Epigastric abdominal pain     TECHNIQUE: The abdomen was scanned in standard fashion with  specialized ultrasound transducer(s) using both gray-scale and limited  color Doppler techniques.     Findings:  Liver: The liver demonstrates normal homogeneous echotexture. No  evidence of a focal hepatic mass. Liver measures approximately 14.6 cm  in craniocaudal dimension. The main portal vein is patent with  antegrade flow, measuring 0.9 cm.     Gallbladder:  There is no wall thickening, pericholecystic fluid,  sonographic Moctezuma's sign or evidence of cholelithiasis.     Bile Ducts: Both the intra- and extrahepatic biliary system are of  normal caliber.  The common bile duct measures 2 mm in diameter.     Pancreas: Visualized portions of the head and body of the pancreas are  unremarkable.      Kidneys: Both kidneys are of normal echotexture, without mass or  hydronephrosis.   The craniocaudal dimensions are: right- 8.8 cm,  left- 10.3 cm.     Spleen: The spleen is normal in size,  measuring 10.5 cm in sagittal  dimension.     Aorta and IVC: The visualized portions of the aorta and IVC are  unremarkable. The proximal aorta measures 1.7 cm in diameter and the  IVC measures 1.9 cm in diameter.     Fluid: No evidence of ascites or pleural effusions.                                                                      Impression:   Normal abdominal ultrasound.     MIN RAMÍREZ MD        ASSESSMENT/PLAN:    Claudia Galdamez is a 18 year old female who presents for follow up of gerd  and constipation.     GERD - well controlled while on nexium 20mg daily and when avoiding acidic foods. She does occ have symptoms if she forgets to take medication. Advised that she can take pepcid or tums as needed for any breakthrough symptoms and encouraged her to take nexium daily.     Constipation - well managed while on miralax. Requests prescription. rx sent to pharmacy. Also discussed citrucel fiber as patient/mom worry about expense of miralax. Can trial citrucel fiber daily or alternate between citrucel and miralax.       Follow up in 1 year, sooner if needed for any worsening or concerning symptoms.         Gastroesophageal reflux disease without esophagitis  - esomeprazole (NEXIUM) 20 MG DR capsule  Dispense: 90 capsule; Refill: 3  Slow transit constipation  - polyethylene glycol (MIRALAX/GLYCOLAX) packet  Dispense: 90 packet; Refill: 3  Frequent sinus infections  - patient also expresses concerns with frequent sinus infections.   - referral to ENT for further evaluation  - OTOLARYNGOLOGY REFERRAL    Thank you for this consultation.  It was a pleasure to participate in the care of this patient; please contact us with any further questions.     This note was created with voice recognition software, and while reviewed for accuracy, typos may remain.     Xavi Winters PA-C  Gastroenterology   [facility]

## 2019-12-05 NOTE — PATIENT INSTRUCTIONS
Continue Nexium 20 mg every morning on an empty stomach.     You can take pepcid (famotidine) or tums as needed.     Continue miralax once daily for constipation or take Citrucel fiber supplement daily         Patient Education     High-Fiber Diet  Fiber is in fruits, vegetables, cereals, and grains. Fiber passes through your body undigested. A high-fiber diet helps food move through your intestinal tract. The added bulk is helpful in preventing constipation. In people with diverticulosis, fiber helps clean out the pouches along the colon wall. It also prevents new pouches from forming. A high-fiber diet reduces the risk of colon cancer. It also lowers blood cholesterol and prevents high blood sugar in people with diabetes.    The fiber-rich foods listed below should be part of your diet. If you are not used to high-fiber foods, start with 1 or 2 foods from this list. Every 3 to 4 days add a new one to your diet. Do this until you are eating 4 high-fiber foods per day. This should give you 20 to 35 grams of fiber a day. It is also important to drink a lot of water when you are on this diet. You should have 6 to 8 glasses of water a day. Water makes the fiber swell and increases the benefit.  Foods high in dietary fiber  The following foods are high in dietary fiber:    Breads. Breads made with 100% whole-wheat flour; gabby, wheat, or rye crackers; whole-grain tortillas, bran muffins.    Cereals. Whole-grain and bran cereals with bran (shredded wheat, wheat flakes, raisin bran, corn bran); oatmeal, rolled oats, granola, and brown rice.    Fruits. Fresh fruits and their edible skins (pears, prunes, raisins, berries, apples, and apricots); bananas, citrus fruit, mangoes, pineapple; and prune juice.    Nuts. Any nuts and seeds.    Vegetables. Best served raw or lightly cooked. All types, especially: green peas, celery, eggplant, potatoes, spinach, broccoli, [place] sprouts, winter squash, carrots, cauliflower,  soybeans, lentils, and fresh and dried beans of all kinds.    Other. Popcorn, any spices.  Date Last Reviewed: 8/1/2016 2000-2018 [employer]. [address] [place] [address], [place] [address]. All rights reserved. This information is not intended as a substitute for professional medical care. Always follow your healthcare professional's instructions.

## 2020-03-06 ENCOUNTER — TELEPHONE (OUTPATIENT)
Dept: GASTROENTEROLOGY | Facility: CLINIC | Age: 20
End: 2020-03-06

## 2020-03-06 NOTE — TELEPHONE ENCOUNTER
Informed patient that she may pick this up OTC.    Pt vocalizes understanding and does not have any questions at this time.    Margaret Palacios RN  Gastroenterology Care Coordinator  [place], [place]

## 2020-03-06 NOTE — TELEPHONE ENCOUNTER
[facility] Call Center    Phone Message    May a detailed message be left on voicemail: yes     Reason for Call: Patient states medication esomeprazole (NEXIUM) 20 MG DR capsule [48137] (Order [id number]   is not covered by insurance and would like to discuss alternatives. Please call back to advise.      Action Taken: Message routed to:  Adult Clinics: Gastroenterology (GI) p [phone]    Travel Screening: Not Applicable

## 2020-12-07 ENCOUNTER — APPOINTMENT (OUTPATIENT)
Dept: GASTROENTEROLOGY | Facility: CLINIC | Age: 20
End: 2020-12-07